# Patient Record
Sex: MALE | Race: BLACK OR AFRICAN AMERICAN | HISPANIC OR LATINO | Employment: OTHER | URBAN - METROPOLITAN AREA
[De-identification: names, ages, dates, MRNs, and addresses within clinical notes are randomized per-mention and may not be internally consistent; named-entity substitution may affect disease eponyms.]

---

## 2019-11-03 ENCOUNTER — HOSPITAL ENCOUNTER (EMERGENCY)
Facility: HOSPITAL | Age: 64
Discharge: HOME | End: 2019-11-03
Attending: EMERGENCY MEDICINE | Admitting: EMERGENCY MEDICINE

## 2019-11-03 VITALS
TEMPERATURE: 98.1 F | SYSTOLIC BLOOD PRESSURE: 119 MMHG | WEIGHT: 156.53 LBS | BODY MASS INDEX: 21.91 KG/M2 | OXYGEN SATURATION: 96 % | RESPIRATION RATE: 18 BRPM | DIASTOLIC BLOOD PRESSURE: 82 MMHG | HEART RATE: 55 BPM | HEIGHT: 71 IN

## 2019-11-03 DIAGNOSIS — G56.31 RADIAL NERVE PALSY, RIGHT: ICD-10-CM

## 2019-11-03 DIAGNOSIS — M21.331 RIGHT WRIST DROP: Primary | ICD-10-CM

## 2019-11-03 PROCEDURE — 99283 EMERGENCY DEPT VISIT LOW MDM: CPT

## 2019-11-03 PROCEDURE — 82962 GLUCOSE BLOOD TEST: CPT

## 2019-11-03 NOTE — ED NOTES
Pt c/o right arm numbness to posterior arm since last night. Pt states he was laying on his arm trying to get something from underneath his bed and when he got up his arm was numb.      Jewell Hassan RN  11/03/19 [id number]

## 2019-11-03 NOTE — DISCHARGE INSTRUCTIONS
Return for any other numbness or weakness, discoloration, swelling, redness or any other concerns.  Wear wrist splint.

## 2019-11-03 NOTE — ED PROVIDER NOTES
"Subjective   History of Present Illness  Right arm dysfunction  64-year-old male states he was laying on his right arm yesterday evening around 10 PM working on something on his bed he was laying in that position for about 10 minutes and then when he got up he states he was tingling in his right hand and was having difficulty extending his right wrist.  He denies headache or any other focal numbness or weakness or slurred speech or blurry vision.  Review of Systems   HENT: Negative.    Respiratory: Negative.    Cardiovascular: Negative.    Gastrointestinal: Negative.    Neurological: Positive for weakness and numbness. Negative for dizziness, facial asymmetry, speech difficulty, light-headedness and headaches.       No past medical history on file.  Reportedly negative  No Known Allergies    No past surgical history on file.    No family history on file.       No medications      Objective   Physical Exam  /92 (BP Location: Left arm, Patient Position: Sitting)   Pulse 67   Temp 98.1 °F (36.7 °C) (Oral)   Resp 16   Ht 180.3 cm (71\")   Wt 71 kg (156 lb 8.4 oz)   SpO2 97%   BMI 21.83 kg/m²   General: Well-appearing, no acute distress  Psych: Oriented, pleasant affect  Respirations: Clear, nonlabored respirations  Heart regular rate and rhythm, no murmurs  Skin: No rash, normal color  Cranial nerves II through XII intact , normal sensory/motor function and strength in four extremities, except for some right wrist drop and some decreased sensation of the dorsal aspect of his hand, no slurred speech, no facial droop, normal finger to nose, normal heel to shin, no nuchal rigidity  Extremities brisk cap refill distally in extremities normal pulses at the wrist    Procedures           ED Course                  MDM  Patient's findings are suggestive of a peripheral nerve compression/neuropathy.  This is expected to be transient given the mechanism.  He was placed in a wrist splint and was referred to orthopedics " for follow-up and consideration of physical therapy if symptoms persist.  He does not have findings of stroke.  He is discharged in good condition and was given warning signs for return.  Final diagnoses:   Right wrist drop   Radial nerve palsy, right              Efren Black MD  11/03/19 [id number]

## 2019-11-04 LAB — GLUCOSE BLDC GLUCOMTR-MCNC: 118 MG/DL (ref 70–105)

## 2021-03-26 ENCOUNTER — HOSPITAL ENCOUNTER (EMERGENCY)
Facility: HOSPITAL | Age: 66
Discharge: HOME | End: 2021-03-26
Attending: EMERGENCY MEDICINE | Admitting: EMERGENCY MEDICINE

## 2021-03-26 ENCOUNTER — APPOINTMENT (OUTPATIENT)
Dept: GENERAL RADIOLOGY | Facility: HOSPITAL | Age: 66
End: 2021-03-26

## 2021-03-26 VITALS
HEART RATE: 79 BPM | BODY MASS INDEX: 21.7 KG/M2 | WEIGHT: 154.98 LBS | SYSTOLIC BLOOD PRESSURE: 137 MMHG | TEMPERATURE: 97.7 F | HEIGHT: 71 IN | OXYGEN SATURATION: 94 % | RESPIRATION RATE: 18 BRPM | DIASTOLIC BLOOD PRESSURE: 104 MMHG

## 2021-03-26 DIAGNOSIS — S20.211A CONTUSION OF RIB ON RIGHT SIDE, INITIAL ENCOUNTER: Primary | ICD-10-CM

## 2021-03-26 DIAGNOSIS — K59.00 CONSTIPATION, UNSPECIFIED CONSTIPATION TYPE: ICD-10-CM

## 2021-03-26 PROCEDURE — 71101 X-RAY EXAM UNILAT RIBS/CHEST: CPT

## 2021-03-26 PROCEDURE — 99282 EMERGENCY DEPT VISIT SF MDM: CPT

## 2021-03-26 NOTE — ED PROVIDER NOTES
Subjective   65-year-old male was on a trailer that was bouncing around.  Patient hit his right posterior ribs against the railing of the trailer.  Patient did not fall off the trailer but was just jostled around in the trailer.  This happened on Sunday.  Patient has mild to moderate pain at this area with movement.  Patient denies any shortness of air or cough or fever.  Patient has had constipation in the past and complains of recurrence with no bowel movement for several weeks.  Patient denies any abdominal pain or vomiting.  Patient has no lower extremity weakness or numbness or paresthesias.  Patient denies any pain in his spine.          Review of Systems   Constitutional: Negative.    Respiratory:        Rib pain as per HPI, otherwise negative   Gastrointestinal: Positive for constipation. Negative for abdominal pain, nausea and vomiting.   Musculoskeletal: Negative.    Neurological: Negative.        No past medical history on file.    No Known Allergies    No past surgical history on file.    No family history on file.    Social History     Socioeconomic History   • Marital status: Single     Spouse name: Not on file   • Number of children: Not on file   • Years of education: Not on file   • Highest education level: Not on file           Objective   Physical Exam  Constitutional:       Appearance: Normal appearance.   HENT:      Head: Normocephalic and atraumatic.   Cardiovascular:      Rate and Rhythm: Normal rate and regular rhythm.      Heart sounds: Normal heart sounds.   Pulmonary:      Effort: Pulmonary effort is normal.      Breath sounds: Normal breath sounds.   Abdominal:      General: Bowel sounds are normal. There is no distension.      Palpations: Abdomen is soft.      Tenderness: There is no abdominal tenderness.   Musculoskeletal:      Cervical back: Normal range of motion and neck supple. No tenderness.      Comments: Thoracic and lumbar spines nontender, mild abrasion overlying right  posterior lateral lower ribs.  Patient denies any blood in his urine.  No palpable rib fracture/crepitus, nontender ribs.   Skin:     General: Skin is warm and dry.      Capillary Refill: Capillary refill takes less than 2 seconds.   Neurological:      General: No focal deficit present.      Mental Status: He is alert and oriented to person, place, and time.   Psychiatric:         Mood and Affect: Mood normal.         Behavior: Behavior normal.         Procedures           ED Course                                           MDM    Final diagnoses:   Contusion of rib on right side, initial encounter   Constipation, unspecified constipation type       ED Disposition  ED Disposition     ED Disposition Condition Comment    Discharge Stable           Alex Heller MD  [id number] [facility]  [address] [place] [address]  [phone]    Schedule an appointment as soon as possible for a visit            Medication List      No changes were made to your prescriptions during this visit.          Alex Newell MD  03/26/21 0148